# Patient Record
Sex: FEMALE | Race: WHITE | Employment: OTHER | URBAN - NONMETROPOLITAN AREA
[De-identification: names, ages, dates, MRNs, and addresses within clinical notes are randomized per-mention and may not be internally consistent; named-entity substitution may affect disease eponyms.]

---

## 2024-05-10 ENCOUNTER — OFFICE VISIT (OUTPATIENT)
Dept: FAMILY MEDICINE CLINIC | Age: 44
End: 2024-05-10

## 2024-05-10 VITALS
DIASTOLIC BLOOD PRESSURE: 86 MMHG | SYSTOLIC BLOOD PRESSURE: 122 MMHG | OXYGEN SATURATION: 99 % | WEIGHT: 190 LBS | RESPIRATION RATE: 18 BRPM | HEART RATE: 78 BPM

## 2024-05-10 DIAGNOSIS — L30.9 DERMATITIS: Primary | ICD-10-CM

## 2024-05-10 PROCEDURE — 99214 OFFICE O/P EST MOD 30 MIN: CPT | Performed by: NURSE PRACTITIONER

## 2024-05-10 RX ORDER — HYDROXYZINE HYDROCHLORIDE 25 MG/1
12.5 TABLET, FILM COATED ORAL EVERY 8 HOURS PRN
Qty: 30 TABLET | Refills: 0 | Status: SHIPPED | OUTPATIENT
Start: 2024-05-10 | End: 2024-05-20

## 2024-05-10 RX ORDER — PREDNISONE 10 MG/1
TABLET ORAL
Qty: 21 TABLET | Refills: 0 | Status: SHIPPED | OUTPATIENT
Start: 2024-05-10

## 2024-05-10 SDOH — ECONOMIC STABILITY: FOOD INSECURITY: WITHIN THE PAST 12 MONTHS, THE FOOD YOU BOUGHT JUST DIDN'T LAST AND YOU DIDN'T HAVE MONEY TO GET MORE.: NEVER TRUE

## 2024-05-10 SDOH — ECONOMIC STABILITY: FOOD INSECURITY: WITHIN THE PAST 12 MONTHS, YOU WORRIED THAT YOUR FOOD WOULD RUN OUT BEFORE YOU GOT MONEY TO BUY MORE.: NEVER TRUE

## 2024-05-10 SDOH — ECONOMIC STABILITY: HOUSING INSECURITY
IN THE LAST 12 MONTHS, WAS THERE A TIME WHEN YOU DID NOT HAVE A STEADY PLACE TO SLEEP OR SLEPT IN A SHELTER (INCLUDING NOW)?: NO

## 2024-05-10 SDOH — ECONOMIC STABILITY: INCOME INSECURITY: HOW HARD IS IT FOR YOU TO PAY FOR THE VERY BASICS LIKE FOOD, HOUSING, MEDICAL CARE, AND HEATING?: NOT HARD AT ALL

## 2024-05-10 ASSESSMENT — ENCOUNTER SYMPTOMS
COLOR CHANGE: 0
ABDOMINAL DISTENTION: 0
CHEST TIGHTNESS: 0
CONSTIPATION: 0
EYE PAIN: 0
COUGH: 0
SHORTNESS OF BREATH: 0
EYE ITCHING: 0
VOMITING: 0
SINUS PRESSURE: 0
CHOKING: 0
URTICARIA: 1
EYE DISCHARGE: 0
WHEEZING: 0
ABDOMINAL PAIN: 0
NAUSEA: 0
VOICE CHANGE: 0
BACK PAIN: 0

## 2024-05-10 ASSESSMENT — PATIENT HEALTH QUESTIONNAIRE - PHQ9
SUM OF ALL RESPONSES TO PHQ9 QUESTIONS 1 & 2: 0
SUM OF ALL RESPONSES TO PHQ QUESTIONS 1-9: 0
SUM OF ALL RESPONSES TO PHQ QUESTIONS 1-9: 0
2. FEELING DOWN, DEPRESSED OR HOPELESS: NOT AT ALL
SUM OF ALL RESPONSES TO PHQ QUESTIONS 1-9: 0
1. LITTLE INTEREST OR PLEASURE IN DOING THINGS: NOT AT ALL
SUM OF ALL RESPONSES TO PHQ QUESTIONS 1-9: 0

## 2024-05-10 NOTE — PROGRESS NOTES
[facility]  [facility]  [facility]  [address] [facility] [address]  Dept: [phone]  Dept Fax: [phone]  Loc: [phone]      Pat Richard is a 44 y.o. female who presents todayfor Urticaria (Went to [facility] 2 weeks ago for sore throat, negative for strep but was given amoxicillin. Had hives on face and abdomin, swollen eyes. Was given prednisone and stopped amoxicillin on 5-1. /Still having hives that are very itchy that's getting worse. )      HPI:      Patient presents with:  Urticaria: Went to [facility] 2 weeks ago for sore throat, negative for strep but was given amoxicillin. Had hives on face and abdomin, swollen eyes. Was given prednisone and stopped amoxicillin on 5-1.   Still having hives that are very itchy that's getting worse.       Mono negative.     Went to  2 times, second time they checked for mono.     5 day dose of prednisone.     Has never had an allergy to medications prior.     Took claritin this am.     5 days since prednisone.     Benadryl       Urticaria  This is a new problem. The current episode started 1 to 4 weeks ago. Pertinent negatives include no congestion, cough, eye pain, fatigue, fever, shortness of breath or vomiting.       The patient has No Known Allergies.    Past MedicalHistory  Pat  has no past medical history on file.    Medications    Current Outpatient Medications:     hydrOXYzine HCl (ATARAX) 25 MG tablet, Take 0.5 tablets by mouth every 8 hours as needed for Itching, Disp: 30 tablet, Rfl: 0    predniSONE (DELTASONE) 10 MG tablet, 6 tabs day 1, 5 tabs day 2, 4 tabs day 3, 3 tabs day 4, 2 tabs day 5, 1 tab day 6, Disp: 21 tablet, Rfl: 0    Subjective:      Review of Systems   Constitutional:  Negative for appetite change, chills, fatigue and fever.   HENT:  Negative for congestion, ear discharge, sinus pressure, tinnitus and voice change.    Eyes:  Negative for pain, discharge, itching and visual